# Patient Record
Sex: MALE | Race: WHITE | URBAN - METROPOLITAN AREA
[De-identification: names, ages, dates, MRNs, and addresses within clinical notes are randomized per-mention and may not be internally consistent; named-entity substitution may affect disease eponyms.]

---

## 2024-10-14 ENCOUNTER — TELEPHONE (OUTPATIENT)
Age: 34
End: 2024-10-14

## 2024-11-05 ENCOUNTER — OFFICE VISIT (OUTPATIENT)
Age: 34
End: 2024-11-05
Payer: MEDICAID

## 2024-11-05 VITALS
SYSTOLIC BLOOD PRESSURE: 110 MMHG | DIASTOLIC BLOOD PRESSURE: 80 MMHG | HEART RATE: 98 BPM | OXYGEN SATURATION: 98 % | WEIGHT: 170 LBS

## 2024-11-05 DIAGNOSIS — I49.9 IRREGULAR HEART RATE: Primary | ICD-10-CM

## 2024-11-05 PROCEDURE — 99204 OFFICE O/P NEW MOD 45 MIN: CPT | Performed by: SPECIALIST

## 2024-11-05 PROCEDURE — 93005 ELECTROCARDIOGRAM TRACING: CPT | Performed by: SPECIALIST

## 2024-11-05 RX ORDER — FOLIC ACID 1 MG/1
1 TABLET ORAL DAILY
COMMUNITY

## 2024-11-05 RX ORDER — POLYETHYLENE GLYCOL 3350 17 G/17G
17 POWDER, FOR SOLUTION ORAL DAILY PRN
COMMUNITY

## 2024-11-05 RX ORDER — BACLOFEN 10 MG/1
10 TABLET ORAL 3 TIMES DAILY
COMMUNITY

## 2024-11-05 RX ORDER — LEVETIRACETAM 1000 MG/1
1000 TABLET ORAL 2 TIMES DAILY
COMMUNITY

## 2024-11-05 RX ORDER — LANOLIN ALCOHOL/MO/W.PET/CERES
100 CREAM (GRAM) TOPICAL DAILY
COMMUNITY

## 2024-11-05 RX ORDER — FERROUS GLUCONATE 324(38)MG
324 TABLET ORAL
COMMUNITY

## 2024-11-05 RX ORDER — CHLORAL HYDRATE 500 MG
CAPSULE ORAL 3 TIMES DAILY
COMMUNITY

## 2024-11-05 NOTE — PROGRESS NOTES
Brayan Young MD. [facility]          Patient: Donovan Cannon Jr.  : 1990      Today's Date: 2024        HISTORY OF PRESENT ILLNESS:     History of Present Illness:    Referred for HTN  His BP was high at [facility] while admission there. Denies any cardiac complaints  - no CP or SOB.   BP has been OK.        PAST MEDICAL HISTORY:     Past Medical History:   Diagnosis Date    Alcohol abuse     Depression     Smoking     TBI (traumatic brain injury)            CURRENT MEDICATIONS:    .  Current Outpatient Medications   Medication Sig Dispense Refill    baclofen (LIORESAL) 10 MG tablet Take 1 tablet by mouth 3 times daily      ferrous gluconate (FERGON) 324 (38 Fe) MG tablet Take 1 tablet by mouth daily (with breakfast)      folic acid (FOLVITE) 1 MG tablet Take 1 tablet by mouth daily      levETIRAcetam (KEPPRA) 1000 MG tablet Take 1 tablet by mouth 2 times daily      Omega-3 Fatty Acids (FISH OIL) 1000 MG capsule Take by mouth 3 times daily      polyethylene glycol (GLYCOLAX) 17 g packet Take 1 packet by mouth daily as needed for Constipation      sertraline (ZOLOFT) 50 MG tablet Take 1 tablet by mouth daily      thiamine 100 MG tablet Take 1 tablet by mouth daily       No current facility-administered medications for this visit.       No Known Allergies      SOCIAL HISTORY:     Social History     Tobacco Use    Smoking status: Every Day     Types: Cigarettes    Smokeless tobacco: Never         FAMILY HISTORY:     History reviewed. No pertinent family history.      REVIEW OF SYMPTOMS:     Review of Symptoms:  Constitutional: Negative for fever, chills  HEENT: Negative for nosebleeds, tinnitus, and vision changes.   Respiratory: Negative for cough, wheezing  Cardiovascular: Negative for orthopnea, claudication, syncope  Gastrointestinal: Negative for abdominal pain, diarrhea, melena.   Genitourinary: Negative for dysuria  Musculoskeletal: Negative for myalgias.   Skin: Negative for rash  Heme: No problems

## 2024-11-05 NOTE — PROGRESS NOTES
Chief Complaint   Patient presents with    New Patient     Vitals:    11/05/24 1020   BP: 110/80   Site: Left Upper Arm   Position: Sitting   Cuff Size: Medium Adult   Pulse: 98   SpO2: 98%   Weight: 77.1 kg (170 lb)      /80 (Site: Left Upper Arm, Position: Sitting, Cuff Size: Medium Adult)   Pulse 98   Wt 77.1 kg (170 lb)   SpO2 98%

## 2024-11-05 NOTE — PATIENT INSTRUCTIONS
canned fruit, 1/4 cup dried fruit, or 4 ounces (1/2 cup) of fruit juice. Choose fruit more often than fruit juice.  Eat 4 to 5 servings of vegetables each day. A serving is 1 cup of lettuce or raw leafy vegetables, 1/2 cup of chopped or cooked vegetables, or 4 ounces (1/2 cup) of vegetable juice. Choose vegetables more often than vegetable juice.  Get 2 to 3 servings of low-fat and fat-free dairy each day. A serving is 8 ounces of milk, 1 cup of yogurt, or 1½ ounces of cheese.  Eat 6 to 8 servings of grains each day. A serving is 1 slice of bread, 1 ounce of dry cereal, or 1/2 cup of cooked rice, pasta, or cooked cereal. Try to choose whole-grain products as much as possible.  Limit lean meat, poultry, and fish to 6 ounces or less each day. One egg counts as 1 ounce.  Eat 4 to 5 servings of nuts, seeds, and legumes (cooked dried beans, lentils, and split peas) each week. A serving is 1/3 cup of nuts, 2 tablespoons of seeds, 2 tablespoons of peanut butter, or 1/2 cup of cooked beans or peas.  Limit fats and oils to 2 to 3 servings each day. A serving is 1 teaspoon of vegetable oil or 2 tablespoons of salad dressing.  Limit sweets and added sugars to 5 servings or less a week. A serving is 1 tablespoon jelly or jam, 1/2 cup sorbet, or 1 cup of lemonade.  Eat less than 2,300 milligrams (mg) of sodium a day. If you limit your sodium to 1,500 mg a day, you can lower your blood pressure even more.  Be aware that all of these are the suggested number of servings for people who eat 1,800 to 2,000 calories a day. Your recommended number of servings may be different if you need more or fewer calories.  Tips for success  Start small. Make small changes, and stick with them. Once those changes become habit, add a few more changes.  Try some of the following:  Make it a goal to eat a fruit or vegetable at every meal and at snacks. This will make it easy to get the recommended amount of fruits and vegetables each day.  Try yogurt

## 2024-11-13 ENCOUNTER — TELEPHONE (OUTPATIENT)
Age: 34
End: 2024-11-13

## 2024-11-13 ENCOUNTER — OFFICE VISIT (OUTPATIENT)
Age: 34
End: 2024-11-13
Payer: MEDICAID

## 2024-11-13 VITALS
RESPIRATION RATE: 18 BRPM | TEMPERATURE: 99.5 F | HEIGHT: 72 IN | DIASTOLIC BLOOD PRESSURE: 71 MMHG | WEIGHT: 169.2 LBS | OXYGEN SATURATION: 95 % | SYSTOLIC BLOOD PRESSURE: 104 MMHG | BODY MASS INDEX: 22.92 KG/M2 | HEART RATE: 105 BPM

## 2024-11-13 DIAGNOSIS — Z00.00 ANNUAL PHYSICAL EXAM: ICD-10-CM

## 2024-11-13 DIAGNOSIS — N50.89 TESTICULAR MASS: ICD-10-CM

## 2024-11-13 DIAGNOSIS — Z11.59 NEED FOR HEPATITIS C SCREENING TEST: ICD-10-CM

## 2024-11-13 DIAGNOSIS — Z11.4 SCREENING FOR HIV WITHOUT PRESENCE OF RISK FACTORS: ICD-10-CM

## 2024-11-13 DIAGNOSIS — Z00.00 ANNUAL PHYSICAL EXAM: Primary | ICD-10-CM

## 2024-11-13 PROCEDURE — 99204 OFFICE O/P NEW MOD 45 MIN: CPT | Performed by: STUDENT IN AN ORGANIZED HEALTH CARE EDUCATION/TRAINING PROGRAM

## 2024-11-13 RX ORDER — CHLORAL HYDRATE 500 MG
1000 CAPSULE ORAL 3 TIMES DAILY
Qty: 90 CAPSULE | Refills: 0 | Status: SHIPPED | OUTPATIENT
Start: 2024-11-13

## 2024-11-13 RX ORDER — LEVETIRACETAM 1000 MG/1
1000 TABLET ORAL 2 TIMES DAILY
Qty: 180 TABLET | Refills: 0 | Status: SHIPPED | OUTPATIENT
Start: 2024-11-13

## 2024-11-13 RX ORDER — FERROUS GLUCONATE 324(38)MG
324 TABLET ORAL
Qty: 90 TABLET | Refills: 0 | Status: SHIPPED | OUTPATIENT
Start: 2024-11-13

## 2024-11-13 RX ORDER — POLYETHYLENE GLYCOL 3350 17 G/17G
17 POWDER, FOR SOLUTION ORAL DAILY PRN
Qty: 527 G | Refills: 0 | Status: SHIPPED | OUTPATIENT
Start: 2024-11-13

## 2024-11-13 RX ORDER — LANOLIN ALCOHOL/MO/W.PET/CERES
100 CREAM (GRAM) TOPICAL DAILY
Qty: 90 TABLET | Refills: 0 | Status: SHIPPED | OUTPATIENT
Start: 2024-11-13

## 2024-11-13 RX ORDER — FOLIC ACID 1 MG/1
1 TABLET ORAL DAILY
Qty: 90 TABLET | Refills: 0 | Status: SHIPPED | OUTPATIENT
Start: 2024-11-13

## 2024-11-13 RX ORDER — BACLOFEN 10 MG/1
10 TABLET ORAL 3 TIMES DAILY
Qty: 270 TABLET | Refills: 0 | Status: SHIPPED | OUTPATIENT
Start: 2024-11-13

## 2024-11-13 RX ORDER — CELECOXIB 200 MG/1
200 CAPSULE ORAL 2 TIMES DAILY
COMMUNITY
End: 2024-11-13 | Stop reason: SDUPTHER

## 2024-11-13 RX ORDER — CELECOXIB 200 MG/1
200 CAPSULE ORAL 2 TIMES DAILY
Qty: 180 CAPSULE | Refills: 0 | Status: SHIPPED | OUTPATIENT
Start: 2024-11-13

## 2024-11-13 SDOH — ECONOMIC STABILITY: FOOD INSECURITY: WITHIN THE PAST 12 MONTHS, YOU WORRIED THAT YOUR FOOD WOULD RUN OUT BEFORE YOU GOT MONEY TO BUY MORE.: NEVER TRUE

## 2024-11-13 SDOH — HEALTH STABILITY: PHYSICAL HEALTH: ON AVERAGE, HOW MANY MINUTES DO YOU ENGAGE IN EXERCISE AT THIS LEVEL?: 0 MIN

## 2024-11-13 SDOH — ECONOMIC STABILITY: FOOD INSECURITY: WITHIN THE PAST 12 MONTHS, THE FOOD YOU BOUGHT JUST DIDN'T LAST AND YOU DIDN'T HAVE MONEY TO GET MORE.: NEVER TRUE

## 2024-11-13 SDOH — HEALTH STABILITY: PHYSICAL HEALTH: ON AVERAGE, HOW MANY DAYS PER WEEK DO YOU ENGAGE IN MODERATE TO STRENUOUS EXERCISE (LIKE A BRISK WALK)?: 0 DAYS

## 2024-11-13 SDOH — ECONOMIC STABILITY: INCOME INSECURITY: HOW HARD IS IT FOR YOU TO PAY FOR THE VERY BASICS LIKE FOOD, HOUSING, MEDICAL CARE, AND HEATING?: NOT HARD AT ALL

## 2024-11-13 ASSESSMENT — PATIENT HEALTH QUESTIONNAIRE - PHQ9
SUM OF ALL RESPONSES TO PHQ QUESTIONS 1-9: 0
SUM OF ALL RESPONSES TO PHQ QUESTIONS 1-9: 0
SUM OF ALL RESPONSES TO PHQ9 QUESTIONS 1 & 2: 0
1. LITTLE INTEREST OR PLEASURE IN DOING THINGS: NOT AT ALL
SUM OF ALL RESPONSES TO PHQ QUESTIONS 1-9: 0
2. FEELING DOWN, DEPRESSED OR HOPELESS: NOT AT ALL
SUM OF ALL RESPONSES TO PHQ QUESTIONS 1-9: 0

## 2024-11-13 NOTE — TELEPHONE ENCOUNTER
ER/Hospital records requested from June, 20024, seizure, TBI, and subsequent records, from [facility]      Parisa Arevalo LPN

## 2024-11-13 NOTE — PATIENT INSTRUCTIONS
I will reach out to our physician liaison to see if I can get you scheduled with neurology earlier. If there is no one available in [place] ([facility], [facility], [facility] or private)- consider reaching out to [facility], [place], [facility] and [facility] in the [facility] area or [facility] in [place].

## 2024-11-13 NOTE — TELEPHONE ENCOUNTER
PA for Celebrex was attempted.[facility] called @ [phone],Katerin VELIZ PA rep stated unable to complete PA over the phone and they are not contracted with [employer].Celebrex form has been fax to office to complete and fax back to plan.Please follow up.Thanks

## 2024-11-14 ENCOUNTER — TELEPHONE (OUTPATIENT)
Age: 34
End: 2024-11-14

## 2024-11-14 LAB
ALBUMIN SERPL-MCNC: 3.8 G/DL (ref 3.5–5)
ALBUMIN/GLOB SERPL: 1 (ref 1.1–2.2)
ALP SERPL-CCNC: 82 U/L (ref 45–117)
ALT SERPL-CCNC: 37 U/L (ref 12–78)
ANION GAP SERPL CALC-SCNC: 9 MMOL/L (ref 2–12)
AST SERPL-CCNC: 20 U/L (ref 15–37)
BILIRUB SERPL-MCNC: 0.3 MG/DL (ref 0.2–1)
BUN SERPL-MCNC: 23 MG/DL (ref 6–20)
BUN/CREAT SERPL: 29 (ref 12–20)
CALCIUM SERPL-MCNC: 9.9 MG/DL (ref 8.5–10.1)
CHLORIDE SERPL-SCNC: 107 MMOL/L (ref 97–108)
CHOLEST SERPL-MCNC: 274 MG/DL
CO2 SERPL-SCNC: 22 MMOL/L (ref 21–32)
CREAT SERPL-MCNC: 0.78 MG/DL (ref 0.7–1.3)
ERYTHROCYTE [DISTWIDTH] IN BLOOD BY AUTOMATED COUNT: NORMAL % (ref 11.5–14.5)
GLOBULIN SER CALC-MCNC: 3.8 G/DL (ref 2–4)
GLUCOSE SERPL-MCNC: 104 MG/DL (ref 65–100)
HCT VFR BLD AUTO: NORMAL % (ref 36.6–50.3)
HCV AB SER IA-ACNC: 0.15 INDEX
HCV AB SERPL QL IA: NONREACTIVE
HDLC SERPL-MCNC: 44 MG/DL
HDLC SERPL: 6.2 (ref 0–5)
HGB BLD-MCNC: NORMAL G/DL (ref 12.1–17)
HIV 1+2 AB+HIV1 P24 AG SERPL QL IA: NONREACTIVE
HIV 1/2 RESULT COMMENT: NORMAL
LDLC SERPL CALC-MCNC: ABNORMAL MG/DL (ref 0–100)
LDLC SERPL DIRECT ASSAY-MCNC: 177 MG/DL (ref 0–100)
MCH RBC QN AUTO: NORMAL PG (ref 26–34)
MCHC RBC AUTO-ENTMCNC: NORMAL G/DL (ref 30–36.5)
MCV RBC AUTO: NORMAL FL (ref 80–99)
NRBC # BLD: 0 K/UL (ref 0–0.01)
NRBC BLD-RTO: 0 PER 100 WBC
PLATELET # BLD AUTO: NORMAL K/UL (ref 150–400)
POTASSIUM SERPL-SCNC: 5.2 MMOL/L (ref 3.5–5.1)
PROT SERPL-MCNC: 7.6 G/DL (ref 6.4–8.2)
RBC # BLD AUTO: NORMAL M/UL (ref 4.1–5.7)
SODIUM SERPL-SCNC: 138 MMOL/L (ref 136–145)
TRIGL SERPL-MCNC: 405 MG/DL
VLDLC SERPL CALC-MCNC: ABNORMAL MG/DL
WBC # BLD AUTO: NORMAL K/UL (ref 4.1–11.1)

## 2024-11-14 NOTE — TELEPHONE ENCOUNTER
[facility] states the celecoxib (CELEBREX) 200 MG capsule requires a prior authorization. They wanted to follow up with the provider to find out whether she wanted to submit the prior authorization or change the medication to something else. I let him know that Dr. Russell is not in the office on Thursdays, but I would send the request for her to review when she returns.

## 2024-11-15 DIAGNOSIS — Z00.00 ANNUAL PHYSICAL EXAM: Primary | ICD-10-CM

## 2024-11-15 LAB — TSH SERPL DL<=0.05 MIU/L-ACNC: 1.3 UIU/ML (ref 0.45–4.5)

## 2024-11-15 ASSESSMENT — ENCOUNTER SYMPTOMS
NAUSEA: 0
CONSTIPATION: 0
SHORTNESS OF BREATH: 0
BACK PAIN: 0
DIARRHEA: 0
CHEST TIGHTNESS: 0
VOMITING: 0
ABDOMINAL PAIN: 0

## 2024-11-15 NOTE — PROGRESS NOTES
I have reviewed all needed documentation in preparation for visit. Verified patient by name and date of birth    Chief Complaint   Patient presents with    New Patient       \"Have you been to the ER, urgent care clinic since your last visit?  Hospitalized since your last visit?\"    YES - When: approximately 5 months ago.  Where and Why: June, [place], Alcohl withdrawal seizure, fell, injured head, resulted in TBI.    “Have you seen or consulted any other health care providers outside our system since your last visit?”    YES - When: approximately 2 months ago.  Where and Why: [facility], Dr Polanco, .           Vitals:    11/13/24 1455   BP: 104/71   Site: Left Upper Arm   Position: Sitting   Cuff Size: Medium Adult   Pulse: (!) 105   Resp: 18   Temp: 99.5 °F (37.5 °C)   TempSrc: Temporal   SpO2: 95%   Weight: 76.7 kg (169 lb 3.2 oz)   Height: 1.816 m (5' 11.5\")       Health Maintenance Due   Topic Date Due    Pneumococcal 0-64 years Vaccine (1 of 2 - PCV) Never done    Depression Screen  Never done    Varicella vaccine (1 of 2 - 13+ 2-dose series) Never done    HIV screen  Never done    Hepatitis C screen  Never done    Hepatitis B vaccine (1 of 3 - 19+ 3-dose series) Never done    DTaP/Tdap/Td vaccine (1 - Tdap) Never done    Flu vaccine (1) Never done    COVID-19 Vaccine (1 - 2023-24 season) Never done       Parisa Arevalo LPN  
          OBJECTIVE  Vitals:    11/13/24 1455   BP: 104/71   Pulse: (!) 105   Resp: 18   Temp: 99.5 °F (37.5 °C)   SpO2: 95%      Wt Readings from Last 3 Encounters:   11/13/24 76.7 kg (169 lb 3.2 oz)   11/05/24 77.1 kg (170 lb)     BMI Readings from Last 3 Encounters:   11/13/24 23.27 kg/m²         Physical Exam  Constitutional:       Appearance: Normal appearance.   HENT:      Head: Microcephalic.      Mouth/Throat:      Mouth: Mucous membranes are moist.   Eyes:      Conjunctiva/sclera: Conjunctivae normal.   Cardiovascular:      Rate and Rhythm: Normal rate and regular rhythm.   Pulmonary:      Effort: Pulmonary effort is normal.      Breath sounds: Normal breath sounds.   Abdominal:      General: Bowel sounds are normal.      Palpations: Abdomen is soft.   Genitourinary:         Comments: Well circumscribed round mass, mobile in scrotum not painful  Musculoskeletal:      Cervical back: Neck supple.   Skin:     General: Skin is warm and dry.   Neurological:      General: No focal deficit present.      Mental Status: He is alert and oriented to person, place, and time.      Cranial Nerves: Dysarthria and facial asymmetry present.      Motor: Weakness present.      Gait: Gait abnormal.   Psychiatric:         Mood and Affect: Mood normal.         Behavior: Behavior normal.         Thought Content: Thought content normal.         Lab Results   Component Value Date    CHOL 274 (H) 11/13/2024    TRIG 405 (H) 11/13/2024    HDL 44 11/13/2024    LDL Not calculated due to elevated triglyceride level 11/13/2024    VLDL  11/13/2024     Calculation not valid with this patient's other Lipid values.    CHOLHDLRATIO 6.2 (H) 11/13/2024     Lab Results   Component Value Date    WBC PLEASE DISREGARD RESULTS 11/13/2024    HGB PLEASE DISREGARD RESULTS 11/13/2024    HCT PLEASE DISREGARD RESULTS 11/13/2024    MCV Cannot be calculated 11/13/2024    PLT PLEASE DISREGARD RESULTS 11/13/2024     Lab Results   Component Value Date

## 2024-11-18 RX ORDER — CHLORAL HYDRATE 500 MG
CAPSULE ORAL
Qty: 90 CAPSULE | Refills: 0 | OUTPATIENT
Start: 2024-11-18

## 2024-11-21 ENCOUNTER — TELEPHONE (OUTPATIENT)
Age: 34
End: 2024-11-21

## 2024-11-21 RX ORDER — CHLORAL HYDRATE 500 MG
1000 CAPSULE ORAL 3 TIMES DAILY
Qty: 90 CAPSULE | Refills: 0 | Status: SHIPPED | OUTPATIENT
Start: 2024-11-21

## 2024-11-21 NOTE — TELEPHONE ENCOUNTER
[facility] requesting a refill of Omega-3 Fatty Acids (FISH OIL) 1000 MG capsule for the patient. She states they have already requested this prescription and received a denial as it is too soon for a refill. She states she understands, but they prescribed the patient a partial amount of this medication so that he could catch up with the rest of the member's at his group home and are unable to fill the 90 day supply. She is requested a new prescription be sent to them.      She also states that celecoxib (CELEBREX) 200 MG capsule requires a prior authorization.

## 2024-11-21 NOTE — TELEPHONE ENCOUNTER
Refill request received from [facility]  for   Requested Prescriptions     Pending Prescriptions Disp Refills    Omega-3 Fatty Acids (FISH OIL) 1000 MG capsule 90 capsule 0     Sig: Take 1 capsule by mouth 3 times daily     Last office visit: 11/13/2024   Next office visit: 2/10/2025     Routed to Do Russell  for review.         Jessica Keys Cma

## 2024-12-17 RX ORDER — CHLORAL HYDRATE 500 MG
CAPSULE ORAL
Qty: 90 CAPSULE | Refills: 0 | Status: SHIPPED | OUTPATIENT
Start: 2024-12-17

## 2024-12-17 RX ORDER — POLYETHYLENE GLYCOL 3350 17 G/17G
POWDER, FOR SOLUTION ORAL
Qty: 510 G | Refills: 2 | Status: SHIPPED | OUTPATIENT
Start: 2024-12-17

## 2025-01-14 RX ORDER — LEVETIRACETAM 1000 MG/1
TABLET ORAL
Qty: 180 TABLET | Refills: 0 | Status: SHIPPED | OUTPATIENT
Start: 2025-01-14

## 2025-01-14 RX ORDER — BACLOFEN 10 MG/1
TABLET ORAL
Qty: 270 TABLET | Refills: 0 | Status: SHIPPED | OUTPATIENT
Start: 2025-01-14

## 2025-01-14 RX ORDER — FOLIC ACID 1 MG/1
TABLET ORAL
Qty: 90 TABLET | Refills: 0 | Status: SHIPPED | OUTPATIENT
Start: 2025-01-14

## 2025-01-14 RX ORDER — FERROUS SULFATE 325(65) MG
1 TABLET ORAL
Qty: 90 TABLET | Refills: 0 | Status: SHIPPED | OUTPATIENT
Start: 2025-01-14

## 2025-01-14 RX ORDER — MAGNESIUM GLUCONATE 27 MG(500)
TABLET ORAL
Qty: 90 CAPSULE | Refills: 0 | Status: SHIPPED | OUTPATIENT
Start: 2025-01-14

## 2025-01-14 RX ORDER — LANOLIN ALCOHOL/MO/W.PET/CERES
CREAM (GRAM) TOPICAL
Qty: 90 TABLET | Refills: 0 | Status: SHIPPED | OUTPATIENT
Start: 2025-01-14

## 2025-01-30 ENCOUNTER — TELEPHONE (OUTPATIENT)
Age: 35
End: 2025-01-30

## 2025-01-30 NOTE — TELEPHONE ENCOUNTER
Pt is being discharged from [facility] due to a fall. Was admitted 1/21/2025 -1/31/2025, lady from [facility] wants to know if provider will follow for home health care.    Please give Veronica Newsome a call back with verdict [phone]

## 2025-02-04 ENCOUNTER — TELEPHONE (OUTPATIENT)
Age: 35
End: 2025-02-04

## 2025-02-05 NOTE — TELEPHONE ENCOUNTER
Zhane from [facility] was looking for the provider to possibly sign off on the patient to receive speech therapy.    She would love a call back [phone]

## 2025-02-09 SDOH — ECONOMIC STABILITY: FOOD INSECURITY: WITHIN THE PAST 12 MONTHS, YOU WORRIED THAT YOUR FOOD WOULD RUN OUT BEFORE YOU GOT MONEY TO BUY MORE.: OFTEN TRUE

## 2025-02-09 SDOH — ECONOMIC STABILITY: FOOD INSECURITY: WITHIN THE PAST 12 MONTHS, THE FOOD YOU BOUGHT JUST DIDN'T LAST AND YOU DIDN'T HAVE MONEY TO GET MORE.: OFTEN TRUE

## 2025-02-09 SDOH — ECONOMIC STABILITY: INCOME INSECURITY: IN THE LAST 12 MONTHS, WAS THERE A TIME WHEN YOU WERE NOT ABLE TO PAY THE MORTGAGE OR RENT ON TIME?: YES

## 2025-02-09 SDOH — ECONOMIC STABILITY: TRANSPORTATION INSECURITY
IN THE PAST 12 MONTHS, HAS LACK OF TRANSPORTATION KEPT YOU FROM MEETINGS, WORK, OR FROM GETTING THINGS NEEDED FOR DAILY LIVING?: NO

## 2025-02-09 SDOH — ECONOMIC STABILITY: TRANSPORTATION INSECURITY
IN THE PAST 12 MONTHS, HAS THE LACK OF TRANSPORTATION KEPT YOU FROM MEDICAL APPOINTMENTS OR FROM GETTING MEDICATIONS?: NO

## 2025-02-10 ENCOUNTER — OFFICE VISIT (OUTPATIENT)
Age: 35
End: 2025-02-10
Payer: MEDICAID

## 2025-02-10 VITALS
OXYGEN SATURATION: 96 % | HEIGHT: 72 IN | BODY MASS INDEX: 24.33 KG/M2 | HEART RATE: 105 BPM | RESPIRATION RATE: 18 BRPM | SYSTOLIC BLOOD PRESSURE: 106 MMHG | DIASTOLIC BLOOD PRESSURE: 72 MMHG | TEMPERATURE: 98.5 F | WEIGHT: 179.6 LBS

## 2025-02-10 DIAGNOSIS — E78.00 PURE HYPERCHOLESTEROLEMIA: ICD-10-CM

## 2025-02-10 DIAGNOSIS — S06.9X2D TRAUMATIC BRAIN INJURY, WITH LOSS OF CONSCIOUSNESS OF 31 MINUTES TO 59 MINUTES, SUBSEQUENT ENCOUNTER: Primary | ICD-10-CM

## 2025-02-10 PROCEDURE — 99214 OFFICE O/P EST MOD 30 MIN: CPT | Performed by: STUDENT IN AN ORGANIZED HEALTH CARE EDUCATION/TRAINING PROGRAM

## 2025-02-10 RX ORDER — LACOSAMIDE 50 MG/1
100 TABLET ORAL 2 TIMES DAILY
COMMUNITY

## 2025-02-10 RX ORDER — DIPHENHYDRAMINE HCL 25 MG
25 TABLET ORAL EVERY 6 HOURS PRN
COMMUNITY

## 2025-02-10 RX ORDER — DULOXETIN HYDROCHLORIDE 30 MG/1
30 CAPSULE, DELAYED RELEASE ORAL DAILY
COMMUNITY

## 2025-02-10 RX ORDER — LEVETIRACETAM 500 MG/1
3000 TABLET ORAL 2 TIMES DAILY
COMMUNITY

## 2025-02-10 RX ORDER — FAMOTIDINE 20 MG/1
20 TABLET, FILM COATED ORAL DAILY
COMMUNITY

## 2025-02-10 RX ORDER — ACETAMINOPHEN 500 MG
500 TABLET ORAL EVERY 6 HOURS PRN
COMMUNITY

## 2025-02-10 RX ORDER — MAGNESIUM 30 MG
30 TABLET ORAL PRN
COMMUNITY

## 2025-02-10 ASSESSMENT — PATIENT HEALTH QUESTIONNAIRE - PHQ9
1. LITTLE INTEREST OR PLEASURE IN DOING THINGS: NOT AT ALL
SUM OF ALL RESPONSES TO PHQ QUESTIONS 1-9: 0
SUM OF ALL RESPONSES TO PHQ QUESTIONS 1-9: 0
SUM OF ALL RESPONSES TO PHQ9 QUESTIONS 1 & 2: 0
SUM OF ALL RESPONSES TO PHQ QUESTIONS 1-9: 0
2. FEELING DOWN, DEPRESSED OR HOPELESS: NOT AT ALL
SUM OF ALL RESPONSES TO PHQ QUESTIONS 1-9: 0

## 2025-02-10 ASSESSMENT — ENCOUNTER SYMPTOMS
CONSTIPATION: 0
VOMITING: 0
NAUSEA: 0
ABDOMINAL PAIN: 0
DIARRHEA: 0
BACK PAIN: 0
SHORTNESS OF BREATH: 0
CHEST TIGHTNESS: 0

## 2025-02-10 NOTE — PROGRESS NOTES
I have reviewed all needed documentation in preparation for visit. Verified patient by name and date of birth  Chief Complaint   Patient presents with    Follow-up     4 month follow up        Vitals:    02/10/25 1410   BP: (!) 90/58   Site: Right Upper Arm   Position: Sitting   Cuff Size: Medium Adult   Pulse: (!) 105   Resp: 18   Temp: 98.5 °F (36.9 °C)   TempSrc: Temporal   SpO2: 96%   Weight: 81.5 kg (179 lb 9.6 oz)   Height: 1.816 m (5' 11.5\")       Health Maintenance Due   Topic Date Due    Varicella vaccine (1 of 2 - 13+ 2-dose series) Never done    Hepatitis B vaccine (1 of 3 - 19+ 3-dose series) Never done    DTaP/Tdap/Td vaccine (1 - Tdap) Never done    Pneumococcal 0-49 years Vaccine (1 of 2 - PCV) Never done    Flu vaccine (1) Never done    COVID-19 Vaccine (1 - 2024-25 season) Never done     \"Have you been to the ER, urgent care clinic since your last visit?  Hospitalized since your last visit?\"    NO    “Have you seen or consulted any other health care providers outside of [facility] since your last visit?”    NO          Click Here for Release of Records Request         TERI Hernadez

## 2025-02-10 NOTE — PROGRESS NOTES
Donovan Cannon Jr. is a 34 y.o. male  Chief Complaint   Patient presents with    Follow-up     4 month follow up     Follow-Up from [facility] - 01.21.-29.25   [facility] - 01.29-31.25     Follow up      [facility]  Patient presents for follow up.  Recall:  In June 2024 he had ground level fall suffering subarachnoid hemorrhage requiring emergent L decompressive hemicranectomy. 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   Etiology of GLF was seizure suspected 2/2 EtOH withdrawal. He has follow up with neurosurgery Dr. Polanco at [facility] for flap closure but he does not have a neurologist. He has residual aphasia, weakness and migraines. He has not had a breakthrough seizure since his hospital discharge.    Since our last visit he had planned hospitalization for cranioplasty which went well. He did have two seizures after closure and was started on Vimpat, had another seizure dose adjusted and since discharge has not had any further seizures.     His speech and interactive level has improved tremendously since our last visit. He is able to form complete sentences with some difficulty in word finding. He is more emotive and able to express his opinion through speech and body language.    He has had a few episodes of insomnia, falling asleep for a few hours then waking up restless. It is not consistent or chronic yet. However is likely due to how sedentary he is at his transitional home. He is limited in how much physical exercise he is able to do as he is not allowed to leave or be unsupervised. He does work with PT and OT (as well as SLP) for recovery and has been using resistance band training.     His mother, who is present during today's visit, has also looked into some sheltering arms program she would like him to participate and patient is also very interested in doing these programs.

## 2025-03-04 ENCOUNTER — TELEPHONE (OUTPATIENT)
Age: 35
End: 2025-03-04

## 2025-03-04 NOTE — TELEPHONE ENCOUNTER
[facility] would like to get a verbal for continuing PT twice per week. She can be contacted at the following number [phone].  
Please advise. 
ADMIT

## 2025-03-14 NOTE — TELEPHONE ENCOUNTER
Refill request received from [facility]      for   Requested Prescriptions     Pending Prescriptions Disp Refills    Omega-3 Fatty Acids (OMEGA III EPA+DHA) 1000 MG CAPS [Pharmacy Med Name: OMEGA III EPA+DHA 1000 MG ORAL CAPSULE] 90 capsule 0     Sig: GIVE 1 CAPSULE BY MOUTH 3 TIMES A DAY     Last office visit: 2/10/2025   Next office visit: Visit date not found     Routed to Dr Maine Russell for review.     Parisa Arevalo LPN

## 2025-03-17 RX ORDER — DULOXETIN HYDROCHLORIDE 30 MG/1
30 CAPSULE, DELAYED RELEASE ORAL 2 TIMES DAILY
Qty: 180 CAPSULE | Refills: 0 | Status: SHIPPED | OUTPATIENT
Start: 2025-03-17

## 2025-03-17 RX ORDER — LEVETIRACETAM 500 MG/1
TABLET ORAL
Qty: 180 TABLET | Refills: 1 | Status: SHIPPED | OUTPATIENT
Start: 2025-03-17

## 2025-03-17 RX ORDER — MAGNESIUM GLUCONATE 27 MG(500)
TABLET ORAL
Qty: 90 CAPSULE | Refills: 0 | Status: SHIPPED | OUTPATIENT
Start: 2025-03-17

## 2025-03-17 RX ORDER — BUTALBITAL, ACETAMINOPHEN AND CAFFEINE 50; 325; 40 MG/1; MG/1; MG/1
TABLET ORAL
Qty: 90 TABLET | Refills: 1 | Status: SHIPPED | OUTPATIENT
Start: 2025-03-17

## 2025-03-24 RX ORDER — LACOSAMIDE 50 MG/1
100 TABLET ORAL 2 TIMES DAILY
Qty: 60 TABLET | OUTPATIENT
Start: 2025-03-24

## 2025-03-24 NOTE — TELEPHONE ENCOUNTER
[facility] pharmacy calling to request a refill of    lacosamide (VIMPAT)100 MG TABS tablet for the patient. She states the electronic request is not going through.

## 2025-03-24 NOTE — TELEPHONE ENCOUNTER
Refill request received from [facility] for   Requested Prescriptions     Pending Prescriptions Disp Refills    lacosamide (VIMPAT) 50 MG TABS tablet 60 tablet      Sig: Take 2 tablets by mouth 2 times daily. Max Daily Amount: 200 mg     Last office visit: 2/10/2025   Next office visit: Visit date not found     Routed to Dr Maine Russell for review.

## 2025-03-26 NOTE — TELEPHONE ENCOUNTER
[facility] calling to follow up on the refill request. Informer her that per Dr. Russell, this medication was filled by neurology and they would need to reach out the patient's neurologist to request the refill. She understood.

## 2025-04-15 RX ORDER — MAGNESIUM GLUCONATE 27 MG(500)
TABLET ORAL
Qty: 90 CAPSULE | Refills: 0 | Status: SHIPPED | OUTPATIENT
Start: 2025-04-15

## 2025-04-28 RX ORDER — FOLIC ACID 1 MG/1
TABLET ORAL
Qty: 90 TABLET | Refills: 0 | Status: SHIPPED | OUTPATIENT
Start: 2025-04-28

## 2025-04-28 RX ORDER — LEVETIRACETAM 500 MG/1
TABLET ORAL
Qty: 180 TABLET | Refills: 1 | Status: SHIPPED | OUTPATIENT
Start: 2025-04-28

## 2025-04-28 RX ORDER — BUTALBITAL, ACETAMINOPHEN AND CAFFEINE 50; 325; 40 MG/1; MG/1; MG/1
TABLET ORAL
Qty: 90 TABLET | Refills: 1 | Status: SHIPPED | OUTPATIENT
Start: 2025-04-28

## 2025-04-28 RX ORDER — LANOLIN ALCOHOL/MO/W.PET/CERES
CREAM (GRAM) TOPICAL
Qty: 90 TABLET | Refills: 0 | Status: SHIPPED | OUTPATIENT
Start: 2025-04-28

## 2025-04-28 NOTE — TELEPHONE ENCOUNTER
Refill request received from [facility] for   Requested Prescriptions     Pending Prescriptions Disp Refills    levETIRAcetam (KEPPRA) 500 MG tablet [Pharmacy Med Name: LEVETIRACETAM 500 MG ORAL TABLET] 180 tablet 1     Sig: TAKE 3 TABLETS( 1500MG) BY MOUTH TWICE A DAY    butalbital-acetaminophen-caffeine (FIORICET, ESGIC) -40 MG per tablet [Pharmacy Med Name: BUTALBITAL-APAP-CAFFEINE -40 MG ORAL TABLET] 90 tablet 1     Sig: TAKE 2 TABLETS BY MOUTH EVERY 4 HOURS AS NEEDED FOR HEADACHES    thiamine 100 MG tablet [Pharmacy Med Name: THIAMINE  MG ORAL TABLET] 30 tablet      Sig: GIVE 1 TABLET (100MG) BY MOUTH ONE TIME A DAY FOR SUPPLEMENT     Last office visit: 2/10/2025   Next office visit: 4/28/2025     Routed to Dr Maine Russell for review.

## 2025-04-28 NOTE — TELEPHONE ENCOUNTER
Refill request received from [facility]  for   Requested Prescriptions     Pending Prescriptions Disp Refills    folic acid (FOLVITE) 1 MG tablet [Pharmacy Med Name: FOLIC ACID 1 MG ORAL TABLET] 90 tablet 0     Sig: TAKE 1 TABLET BY MOUTH ONE TIME A DAY FOR SUPPLEMENT     Last office visit: 2/10/2025   Next office visit: Visit date not found     Routed to Dr Maine Russell for review.

## 2025-05-09 DIAGNOSIS — B35.4 TINEA CORPORIS: Primary | ICD-10-CM

## 2025-05-09 RX ORDER — KETOCONAZOLE 20 MG/G
CREAM TOPICAL
Qty: 30 G | Refills: 3 | Status: SHIPPED | OUTPATIENT
Start: 2025-05-09

## 2025-05-12 RX ORDER — MAGNESIUM GLUCONATE 27 MG(500)
TABLET ORAL
Qty: 90 CAPSULE | Refills: 0 | Status: SHIPPED | OUTPATIENT
Start: 2025-05-12

## 2025-05-28 ENCOUNTER — TELEPHONE (OUTPATIENT)
Age: 35
End: 2025-05-28

## 2025-05-28 DIAGNOSIS — Z72.0 NICOTINE USE: Primary | ICD-10-CM

## 2025-05-28 RX ORDER — VARENICLINE TARTRATE 1 MG/1
1 TABLET, FILM COATED ORAL 2 TIMES DAILY
Qty: 180 TABLET | Refills: 1 | Status: SHIPPED | OUTPATIENT
Start: 2025-05-28

## 2025-05-28 NOTE — TELEPHONE ENCOUNTER
Patient's mother presented in clinic requesting to speak with Dr. Russell regarding the patient. I let her know that Dr. Russell is in clinic and would not be able to come out to speak with her, however we could schedule an appointment to address her concerns. I offered a virtual for today at 1:30 PM, however she states she has to take a friend to an appointment at 1 PM. She states the patient will not stop smoking at the group home and is in danger of being evicted and becoming homeless. She wants to know if Dr. Russell can send a smoking cessation aid over to [facility] pharmacy for the patient? I let her know that I would forward her request to Dr. Russell and someone from our office would follow up with her if anything additional is needed. She understood.

## 2025-06-17 RX ORDER — MAGNESIUM GLUCONATE 27 MG(500)
TABLET ORAL
Qty: 90 CAPSULE | Refills: 0 | Status: SHIPPED | OUTPATIENT
Start: 2025-06-17

## 2025-06-17 RX ORDER — BUTALBITAL, ACETAMINOPHEN AND CAFFEINE 50; 325; 40 MG/1; MG/1; MG/1
TABLET ORAL
Qty: 90 TABLET | Refills: 1 | Status: SHIPPED | OUTPATIENT
Start: 2025-06-17

## 2025-06-17 NOTE — TELEPHONE ENCOUNTER
Refill request received from [facility] -    for   Requested Prescriptions     Pending Prescriptions Disp Refills    butalbital-acetaminophen-caffeine (FIORICET, ESGIC) -40 MG per tablet [Pharmacy Med Name: BUTALBITAL-APAP-CAFFEINE -40 MG ORAL TABLET] 90 tablet 1     Sig: TAKE 2 TABLETS BY MOUTH EVERY 4 HOURS AS NEEDED FOR HEADACHES    Omega-3 Fatty Acids (OMEGA III EPA+DHA) 1000 MG CAPS [Pharmacy Med Name: OMEGA III EPA+DHA 1000 MG ORAL CAPSULE] 90 capsule 0     Sig: GIVE 1 CAPSULE BY MOUTH 3 TIMES A DAY     Last office visit: 2/10/2025   Next office visit: Visit date not found     Routed to Dr Maine Russell for review.     Parisa Arevalo LPN

## 2025-06-27 RX ORDER — LEVETIRACETAM 500 MG/1
TABLET ORAL
Qty: 180 TABLET | Refills: 1 | Status: SHIPPED | OUTPATIENT
Start: 2025-06-27

## 2025-06-27 RX ORDER — LANOLIN ALCOHOL/MO/W.PET/CERES
CREAM (GRAM) TOPICAL
Qty: 90 TABLET | Refills: 0 | Status: SHIPPED | OUTPATIENT
Start: 2025-06-27

## 2025-07-18 NOTE — TELEPHONE ENCOUNTER
Refill request received from [facility] - for   Requested Prescriptions     Pending Prescriptions Disp Refills    Omega-3 Fatty Acids (OMEGA III EPA+DHA) 1000 MG CAPS [Pharmacy Med Name: OMEGA III EPA+DHA 1000 MG ORAL CAPSULE] 90 capsule 0     Sig: GIVE 1 CAPSULE BY MOUTH 3 TIMES A DAY     Last office visit: 2/10/2025   Next office visit: Visit date not found     Routed to Dr Maine Russell for review.     Parisa Arevalo LPN

## 2025-07-20 RX ORDER — MAGNESIUM GLUCONATE 27 MG(500)
TABLET ORAL
Qty: 90 CAPSULE | Refills: 0 | Status: SHIPPED | OUTPATIENT
Start: 2025-07-20

## 2025-07-25 NOTE — TELEPHONE ENCOUNTER
Refill request received from [facility] -     for   Requested Prescriptions     Pending Prescriptions Disp Refills    butalbital-acetaminophen-caffeine (FIORICET, ESGIC) -40 MG per tablet [Pharmacy Med Name: BUTALBITAL-APAP-CAFFEINE -40 MG ORAL TABLET] 90 tablet 1     Sig: TAKE 2 TABLETS BY MOUTH EVERY 4 HOURS AS NEEDED FOR HEADACHES    folic acid (FOLVITE) 1 MG tablet [Pharmacy Med Name: FOLIC ACID 1 MG ORAL TABLET] 90 tablet 0     Sig: TAKE 1 TABLET BY MOUTH ONE TIME A DAY FOR SUPPLEMENT     Last office visit: 2/10/2025   Next office visit: Visit date not found     Routed to Dr Maine Russell for review.     Parisa Arevalo LPN

## 2025-07-28 RX ORDER — FOLIC ACID 1 MG/1
TABLET ORAL
Qty: 90 TABLET | Refills: 0 | Status: SHIPPED | OUTPATIENT
Start: 2025-07-28

## 2025-07-28 RX ORDER — BUTALBITAL, ACETAMINOPHEN AND CAFFEINE 50; 325; 40 MG/1; MG/1; MG/1
TABLET ORAL
Qty: 90 TABLET | Refills: 1 | Status: SHIPPED | OUTPATIENT
Start: 2025-07-28

## 2025-08-27 ENCOUNTER — TELEPHONE (OUTPATIENT)
Age: 35
End: 2025-08-27

## 2025-08-27 DIAGNOSIS — Z72.0 NICOTINE USE: ICD-10-CM
